# Patient Record
Sex: FEMALE | Employment: FULL TIME | URBAN - METROPOLITAN AREA
[De-identification: names, ages, dates, MRNs, and addresses within clinical notes are randomized per-mention and may not be internally consistent; named-entity substitution may affect disease eponyms.]

---

## 2023-02-06 ENCOUNTER — HOSPITAL ENCOUNTER (OUTPATIENT)
Dept: RESEARCH | Facility: CLINIC | Age: 28
Discharge: HOME | End: 2023-02-06
Attending: PEDIATRICS

## 2023-02-06 PROCEDURE — 510N000017 HC CRU PATIENT CARE, PER 15 MIN

## 2023-02-06 PROCEDURE — 510N000009 HC RESEARCH FACILITY, PER 15 MIN

## 2023-02-06 PROCEDURE — 510N000018 HC RESEARCH OGTT, PER HOUR

## 2023-12-15 ENCOUNTER — HOSPITAL ENCOUNTER (OUTPATIENT)
Dept: RESEARCH | Facility: CLINIC | Age: 28
Discharge: HOME | End: 2023-12-15
Attending: PEDIATRICS

## 2023-12-15 PROCEDURE — 510N000017 HC CRU PATIENT CARE, PER 15 MIN

## 2023-12-15 PROCEDURE — 510N000009 HC RESEARCH FACILITY, PER 15 MIN

## 2023-12-15 PROCEDURE — 510N000018 HC RESEARCH OGTT, PER HOUR

## 2024-06-21 ENCOUNTER — HOSPITAL ENCOUNTER (OUTPATIENT)
Dept: RESEARCH | Facility: CLINIC | Age: 29
Discharge: HOME | End: 2024-06-21
Attending: PEDIATRICS | Admitting: PEDIATRICS
Payer: COMMERCIAL

## 2024-06-21 PROCEDURE — 510N000017 HC CRU PATIENT CARE, PER 15 MIN

## 2024-06-21 PROCEDURE — 510N000009 HC RESEARCH FACILITY, PER 15 MIN

## 2024-06-21 PROCEDURE — 510N000018 HC RESEARCH OGTT, PER HOUR

## 2024-06-30 ENCOUNTER — OFFICE VISIT (OUTPATIENT)
Dept: FAMILY MEDICINE | Facility: CLINIC | Age: 29
End: 2024-06-30
Payer: COMMERCIAL

## 2024-06-30 VITALS
WEIGHT: 148.3 LBS | SYSTOLIC BLOOD PRESSURE: 112 MMHG | HEART RATE: 57 BPM | DIASTOLIC BLOOD PRESSURE: 70 MMHG | TEMPERATURE: 98 F | OXYGEN SATURATION: 100 %

## 2024-06-30 DIAGNOSIS — S16.1XXA STRAIN OF NECK MUSCLE, INITIAL ENCOUNTER: Primary | ICD-10-CM

## 2024-06-30 PROCEDURE — 99213 OFFICE O/P EST LOW 20 MIN: CPT | Performed by: STUDENT IN AN ORGANIZED HEALTH CARE EDUCATION/TRAINING PROGRAM

## 2024-06-30 RX ORDER — METHYLPREDNISOLONE 4 MG
TABLET, DOSE PACK ORAL
Qty: 21 TABLET | Refills: 0 | Status: SHIPPED | OUTPATIENT
Start: 2024-06-30 | End: 2024-08-14

## 2024-06-30 NOTE — PROGRESS NOTES
Assessment & Plan     Strain of neck muscle, initial encounter  Patient has strained her right neck muscle periodically over the past 8 years.  She has had effective treatment with Medrol Dosepak in the past so I prescribed this.  Muscle relaxers have been ineffective in the past.  Diclofenac gel has been mildly helpful and she has some of this at home.  We discussed working on strengthening the muscles that support her neck and posture as possible preventative measures and referral to physical therapy was placed to help her with working on this  - methylPREDNISolone (MEDROL DOSEPAK) 4 MG tablet therapy pack  Dispense: 21 tablet; Refill: 0  - Physical Therapy  Referral       No follow-ups on file.    [facility]    Kailyn Guthrie is a 28 year old female who presents to clinic today for the following health issues:  Chief Complaint   Patient presents with    Neck Pain     Patient states her R side of her neck has been hurting for years. The usual home remedies are no longer working.       HPI      Review of Systems  Constitutional, HEENT, cardiovascular, pulmonary, gi and gu systems are negative, except as otherwise noted.      Objective    /70 (BP Location: Right arm, Patient Position: Chair, Cuff Size: Adult Small)   Pulse 57   Temp 98  F (36.7  C) (Oral)   Wt 67.3 kg (148 lb 4.8 oz)   SpO2 100%   Physical Exam   GENERAL: alert and no distress  MS: Generalized tension and tightness of the right sternocleidomastoid and right upper trapezius muscles with reduced range of motion of the neck due to muscle tension  SKIN: no suspicious lesions or rashes  NEURO: Normal strength and tone, mentation intact and speech normal

## 2024-07-09 NOTE — PROGRESS NOTES
PHYSICAL THERAPY EVALUATION  Type of Visit: Evaluation       Fall Risk Screen:  Fall screen completed by: PT  Have you fallen 2 or more times in the past year?: No  Have you fallen and had an injury in the past year?: No  Is patient a fall risk?: No    Subjective   Pt is a 29 year-old woman with chief complaint neck pain. When playing soccer games this can increase neck pain. She has had cycles over the past 8 years where 1-2x/year her neck gets painful - e.g., after sneezing. 2 weeks ago she turned to the left when in the car ( to look behind) and had almost a popping feeling and this triggered her pain for 2 weeks. She has to take a lot of ibuprofen to help it and uses ice right away. In the past the pain is on the right side of the neck and can go down to the mid-upper back. 2 weeks ago she had pain radiating to the R elbow. She was seen in urgent care and the Medrol dosepack helps.       Presenting condition or subjective complaint: I keep tweaking my neck 1-2x a year and every time it happens I cant do anything for 4+ days bc of the pain, and sometimes i go to urgent care to request methylprednisolone necause nothing else fixes the pain. Im tired of it and need a permant solution.  Date of onset:      Relevant medical history: Concussions; Heart problems; Neck injury   Dates & types of surgery:      Prior diagnostic imaging/testing results:       Prior therapy history for the same diagnosis, illness or injury: No      Prior Level of Function  Transfers: Independent  Ambulation: Independent  ADL: Independent    Living Environment  Social support: With a significant other or spouse   Type of home: House   Stairs to enter the home: Yes 3 Is there a railing: Yes     Ramp: No   Stairs inside the home: Yes 11 Is there a railing: Yes     Help at home: None  Equipment owned:       Employment: Yes Dietitian  Hobbies/Interests: Gardening, Crossfit    Patient goals for therapy: Work out, live my life pain free, and id  really like to not be constantly worried that i will tweak my neck from something stupid like sneezing or turning my head to the back of the car    Pain assessment: Pain present  Location: right side of neck/Ratin/10 at rest,  with movement 7/10     Objective   CERVICAL SPINE EVALUATION    INTEGUMENTARY (edema, incisions):   POSTURE: Decreased cervical lordosis  GAIT:   Weightbearing Status: WBAT  Assistive Device(s): None  Gait Deviations: WNL  BALANCE/PROPRIOCEPTION: WNL    ROM:   (Degrees) Left AROM Right AROM    Cervical Flexion 41, feels good (repetitions decreased pain)    Cervical Extension 44 with pain starting at 40 deg and worsening at end range    Cervical Side bend 29 with right sided pain 15 with right sided pain (worse than left side)    Cervical Rotation 60 43 with right sided pain    Cervical Protrusion WNL, feels good (repetitions decreased pain)    Cervical Retraction WNL, feels good (at end of session, reps of this started to cause some pain)    Thoracic Flexion     Thoracic Extension     Thoracic Rotation       Left AROM Left PROM Right AROM Right PROM   Shoulder Flexion WNL  WNL    Shoulder Extension WNL  WNL    Shoulder Abduction WNL  WNL    Shoulder Adduction       Shoulder IR WNL  WNL    Shoulder ER       Shoulder Horiz Abduction       Shoulder Horiz Adduction         MYOTOMES:    Left Right   C1-2 (Neck Flexion) 4 4   C3 (Neck Side Bend)  4 4   C4 (Shrug) 5 5   C5 (Deltoid) 5 5   C6 (Biceps) 5 5   C7 (Triceps) 5 5   C8 (Thumb Ext) 5 5   T1 (Intrinsics) 5 5     DTR S:   CORD SIGNS:   DERMATOMES: WNL  NEURAL TENSION:   FLEXIBILITY:    SPECIAL TESTS:    Left Right   Alar Ligament    Cervical Flexion-Rotation     Cervical Rot/Lateral Flex     Compression Positive Positive   Distraction Positive Positive   Spurling s     Thoracic Outlet Screen (Jeanne, Adson)     Transverse Ligament     Vertebral Artery     Cotton Roll Test     Craniocervical Flexor Endurance Test Seconds: 30    Mannheimer Test             PALPATION: tender right cervical paraspinals  SPINAL SEGMENTAL CONCLUSIONS: WNL      Assessment & Plan   CLINICAL IMPRESSIONS  Medical Diagnosis: Strain of neck muscle, initial encounter    Treatment Diagnosis: Cervicalgia   Impression/Assessment: Pt is a 29 year-old woman with chief complaint chronic neck pain. She demonstrates symptoms most consistent with discogenic origin. She has pain/limited AROM with cervical ext, right side bending and R rotation with current symptoms affecting the neck and radiating to scapular region only, though one day recently had pain to the R elbow. She had pain relief with cervical distraction and decreased pain with repeated cervical retraction and flexion (and initially also cervical protraction, but this lessened over time). When she is in pain, she is limited in scanning the environment, cannot participate in soccer and working out and even has difficulty with bed mobility. Recommending follow up with spine specialist, possible imaging and she may benefit from MEDEX program in the future for cervical spine strengthening;  she may also benefit from a home traction unit. She is appropriate for skilled PT to address impairments, instruct in HEP to reduce pain and improve functional mobility.     Clinical Decision Making (Complexity):  Clinical Presentation: Stable/Uncomplicated  Clinical Presentation Rationale: based on medical and personal factors listed in PT evaluation  Clinical Decision Making (Complexity): Low complexity    PLAN OF CARE  Treatment Interventions:  Modalities: Mechanical Traction  Interventions: Manual Therapy, Neuromuscular Re-education, Therapeutic Activity, Therapeutic Exercise, Self-Care/Home Management    Long Term Goals     PT Goal 1  Goal Identifier: Pain  Goal Description: Pt will report decreased neck pain from 7/10 with movement to <2/10 to allow her to participate in soccer and crossfit exercises.  Target Date: 08/22/24      Frequency of  Treatment: 1x/week to every other week  Duration of Treatment: 3 visits      Risks and benefits of evaluation/treatment have been explained.   Patient/Family/caregiver agrees with Plan of Care.     Evaluation Time:     PT Eval, Low Complexity Minutes (53627): 20       Signing Clinician: Tor Lewis PT

## 2024-07-10 ENCOUNTER — THERAPY VISIT (OUTPATIENT)
Dept: PHYSICAL THERAPY | Facility: REHABILITATION | Age: 29
End: 2024-07-10
Payer: COMMERCIAL

## 2024-07-10 DIAGNOSIS — M54.2 CERVICALGIA: Primary | ICD-10-CM

## 2024-07-10 PROCEDURE — 97161 PT EVAL LOW COMPLEX 20 MIN: CPT | Mod: GP | Performed by: PHYSICAL THERAPIST

## 2024-07-10 PROCEDURE — 97110 THERAPEUTIC EXERCISES: CPT | Mod: GP | Performed by: PHYSICAL THERAPIST

## 2024-07-10 PROCEDURE — 97140 MANUAL THERAPY 1/> REGIONS: CPT | Mod: GP | Performed by: PHYSICAL THERAPIST

## 2024-07-11 PROBLEM — M54.2 CERVICALGIA: Status: ACTIVE | Noted: 2024-07-11

## 2024-08-07 NOTE — PROGRESS NOTES
No chief complaint on file.     PCP: Barbi Martinez     Referring Provider: Referred Self    There were no vitals taken for this visit.    ENT Problem List:  Patient Active Problem List   Diagnosis    Cervicalgia      Current Medications:  Current Outpatient Medications   Medication Sig Dispense Refill    methylPREDNISolone (MEDROL DOSEPAK) 4 MG tablet therapy pack Follow Package Directions 21 tablet 0     No current facility-administered medications for this visit.     HPI  Pleasant 29 year old female presents today as a new patient for middle ear myoclonus. She describes experiencing a drumming sensation in her ears since Dec 2021. She has previously seen a different provie who told her that there was nothing she could do about it. She experiences this everyday. Walking around the house barefoot, doing daily tasks, dishes clinking, working out can all trigger the experiences. Strenuous activity increases this to an intense pressure that she has do stall to wait it out. The huge pressure shift causes a noticeable change in her subjective hearing.  She attributes dizziness to her low blood pressure. She suspects having chronic eustachian tube dysfunction, but has not had a formal diagnosis. She recalls having plugged ears with nearly every infection, and a left perforation that occurred in high school about 15 years ago due to pressure from an infection. She is able to induce the sensation by tapping her foot, and has noticed that wearing socks can somewhat prevent that while walking in her house. She is a dietician.     Review of Systems   Constitutional: Negative.    HENT:  Positive for hearing loss and tinnitus. Negative for ear pain.    Eyes:  Negative for blurred vision and double vision.   Respiratory:  Negative for cough.    Neurological:  Positive for dizziness.   Endo/Heme/Allergies:  Negative for environmental allergies.   All other systems reviewed and are negative.      Physical Exam  Vitals and  nursing note reviewed.   Constitutional:       Appearance: Normal appearance.   HENT:      Head: Normocephalic and atraumatic.      Right Ear: Tympanic membrane, ear canal and external ear normal. No middle ear effusion.      Left Ear: Tympanic membrane, ear canal and external ear normal.  No middle ear effusion.      Nose: Nose normal.      Mouth/Throat:      Mouth: Mucous membranes are moist.   Eyes:      Extraocular Movements: Extraocular movements intact.      Pupils: Pupils are equal, round, and reactive to light.   Neurological:      Mental Status: She is alert.   Psychiatric:         Behavior: Behavior is cooperative.     + no signs of infection    AUDIOGRAM 08/14/2024:  Results: hearing WNL bilaterally. 100% word rec bilaterally. Tymps WNL. Present 1 kHz ipsi/contra bilaterally.   Right: Speech reception threshold is 10 dB with 100% word recognition. Tympanogram -- type.  Left: Speech reception threshold is 5 dB with 100% word recognition. Tympanogram -- type.    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 She said that she was able to induce the experience while in-clinic, but I was unable to visualize myoclonic vibrations that may or may not be related to her issue. It was explained that there are two muscles in the ears that may be responsible for causing undesired pressure in the middle ear. Her audiogram was independently reviewed and discussed in detail with the patient, which demonstrated normal hearing bilaterally. Stress, sleeplessness and anxiety could be potential causes, and we discussed how medications for these conditions have desired side effects that may improve her symptoms. It has been recommended to explore medication-focused treatment options before considering more invasive surgical options such as TT muscle tenotomy. We discussed the potential of PE tubes and equalizing the pressure to see if there is improvement. She has been advised to  practice good hydration and relaxation, and retrain the brain to learn to ignore the sensations. She has decided to begin with a prescription of clonazePAM (KLONOPIN) to see there is improvement before her next visit. She has been encouraged to reach out if new symptoms arise or if there are any new concerns before then.    Follow up in clinic in 3-weeks.    Scribe/Staff:    Scribe Disclosure:   I, Marita Araujo, am serving as a scribe; to document services personally performed by Desirae Quesada MD based on data collection and the provider's statements to me.     Provider Disclosure:  I agree with above History, Review of Systems, Physical exam and Plan.  I have reviewed the content of the documentation and have edited it as needed. I have personally performed the services documented here and the documentation accurately represents those services and the decisions I have made.      Electronically signed by:  Desirae Quesada MD

## 2024-08-14 ENCOUNTER — OFFICE VISIT (OUTPATIENT)
Dept: AUDIOLOGY | Facility: CLINIC | Age: 29
End: 2024-08-14
Payer: COMMERCIAL

## 2024-08-14 ENCOUNTER — OFFICE VISIT (OUTPATIENT)
Dept: OTOLARYNGOLOGY | Facility: CLINIC | Age: 29
End: 2024-08-14
Payer: COMMERCIAL

## 2024-08-14 DIAGNOSIS — H93.91 EAR PROBLEM, RIGHT: Primary | ICD-10-CM

## 2024-08-14 DIAGNOSIS — H93.11 TENSOR TYMPANI INDUCED TINNITUS OF RIGHT EAR: Primary | ICD-10-CM

## 2024-08-14 PROCEDURE — 99203 OFFICE O/P NEW LOW 30 MIN: CPT | Performed by: OTOLARYNGOLOGY

## 2024-08-14 PROCEDURE — 92557 COMPREHENSIVE HEARING TEST: CPT | Performed by: AUDIOLOGIST

## 2024-08-14 PROCEDURE — 92550 TYMPANOMETRY & REFLEX THRESH: CPT | Performed by: AUDIOLOGIST

## 2024-08-14 RX ORDER — CLONAZEPAM 0.5 MG/1
0.5 TABLET ORAL 2 TIMES DAILY PRN
Qty: 60 TABLET | Refills: 0 | Status: SHIPPED | OUTPATIENT
Start: 2024-08-14 | End: 2024-09-13

## 2024-08-14 ASSESSMENT — ENCOUNTER SYMPTOMS
DIZZINESS: 1
COUGH: 0
CONSTITUTIONAL NEGATIVE: 1
DOUBLE VISION: 0
BLURRED VISION: 0

## 2024-08-14 NOTE — PROGRESS NOTES
AUDIOLOGY REPORT    SUMMARY: Audiology visit completed. See audiogram for results.    RECOMMENDATIONS: Follow-up with ENT.    Elena Kulkarni  Doctor of Audiology  [place] License # [id number]

## 2024-08-14 NOTE — NURSING NOTE
Jerri GARCIA Lopez's chief complaint for this visit includes:  Chief Complaint   Patient presents with    Consult     Bilateral middle ear myoclonus, right worse than left, drumming sound in ear, ongoing since December 2021. Seen by ENT in January 2022 who told her there is nothing to help with it. No improvements, but she feels as though she is getting used to the noise. Onset when walking barefoot on hard surface, clanging dishes/silverware together, or after working out. No pain in the ears.     PCP: Barbi Martinez    Referring Provider:  Referred Self, MD  No address on file    There were no vitals taken for this visit.      Jonny Alaniz, EMT  August 14, 2024

## 2024-08-14 NOTE — LETTER
8/14/2024      Jerri Lopez  [address]  [place] [address]      Dear Colleague,    Thank you for referring your patient, Jerri Lopez, to the [facility]. Please see a copy of my visit note below.    No chief complaint on file.     PCP: Barbi Martinez     Referring Provider: Referred Self    There were no vitals taken for this visit.    ENT Problem List:  Patient Active Problem List   Diagnosis     Cervicalgia      Current Medications:  Current Outpatient Medications   Medication Sig Dispense Refill     methylPREDNISolone (MEDROL DOSEPAK) 4 MG tablet therapy pack Follow Package Directions 21 tablet 0     No current facility-administered medications for this visit.     HPI  Pleasant 29 year old female presents today as a new patient for middle ear myoclonus. She describes experiencing a drumming sensation in her ears since Dec 2021. She has previously seen a different provie who told her that there was nothing she could do about it. She experiences this everyday. Walking around the house barefoot, doing daily tasks, dishes clinking, working out can all trigger the experiences. Strenuous activity increases this to an intense pressure that she has do stall to wait it out. The huge pressure shift causes a noticeable change in her subjective hearing.  She attributes dizziness to her low blood pressure. She suspects having chronic eustachian tube dysfunction, but has not had a formal diagnosis. She recalls having plugged ears with nearly every infection, and a left perforation that occurred in high school about 15 years ago due to pressure from an infection. She is able to induce the sensation by tapping her foot, and has noticed that wearing socks can somewhat prevent that while walking in her house. She is a dietician.     Review of Systems   Constitutional: Negative.    HENT:  Positive for hearing loss and tinnitus. Negative for ear pain.    Eyes:  Negative  for blurred vision and double vision.   Respiratory:  Negative for cough.    Neurological:  Positive for dizziness.   Endo/Heme/Allergies:  Negative for environmental allergies.   All other systems reviewed and are negative.      Physical Exam  Vitals and nursing note reviewed.   Constitutional:       Appearance: Normal appearance.   HENT:      Head: Normocephalic and atraumatic.      Right Ear: Tympanic membrane, ear canal and external ear normal. No middle ear effusion.      Left Ear: Tympanic membrane, ear canal and external ear normal.  No middle ear effusion.      Nose: Nose normal.      Mouth/Throat:      Mouth: Mucous membranes are moist.   Eyes:      Extraocular Movements: Extraocular movements intact.      Pupils: Pupils are equal, round, and reactive to light.   Neurological:      Mental Status: She is alert.   Psychiatric:         Behavior: Behavior is cooperative.     + no signs of infection    AUDIOGRAM 08/14/2024:  Results: hearing WNL bilaterally. 100% word rec bilaterally. Tymps WNL. Present 1 kHz ipsi/contra bilaterally.   Right: Speech reception threshold is 10 dB with 100% word recognition. Tympanogram -- type.  Left: Speech reception threshold is 5 dB with 100% word recognition. Tympanogram -- type.    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 She said that she was able to induce the experience while in-clinic, but I was unable to visualize myoclonic vibrations that may or may not be related to her issue. It was explained that there are two muscles in the ears that may be responsible for causing undesired pressure in the middle ear. Her audiogram was independently reviewed and discussed in detail with the patient, which demonstrated normal hearing bilaterally. Stress, sleeplessness and anxiety could be potential causes, and we discussed how medications for these conditions have desired side effects that may improve her symptoms. It has been  recommended to explore medication-focused treatment options before considering more invasive surgical options such as TT muscle tenotomy. We discussed the potential of PE tubes and equalizing the pressure to see if there is improvement. She has been advised to practice good hydration and relaxation, and retrain the brain to learn to ignore the sensations. She has decided to begin with a prescription of clonazePAM (KLONOPIN) to see there is improvement before her next visit. She has been encouraged to reach out if new symptoms arise or if there are any new concerns before then.    Follow up in clinic in 3-weeks.    Scribe/Staff:    Scribe Disclosure:   I, Marita Araujo, am serving as a scribe; to document services personally performed by Desirae Quesada MD based on data collection and the provider's statements to me.     Provider Disclosure:  I agree with above History, Review of Systems, Physical exam and Plan.  I have reviewed the content of the documentation and have edited it as needed. I have personally performed the services documented here and the documentation accurately represents those services and the decisions I have made.      Electronically signed by:  Desirae Quesada MD         Again, thank you for allowing me to participate in the care of your patient.        Sincerely,        Desirae Quesada MD

## 2024-12-23 PROBLEM — M54.2 CERVICALGIA: Status: RESOLVED | Noted: 2024-07-11 | Resolved: 2024-12-23

## 2025-01-13 ENCOUNTER — HOSPITAL ENCOUNTER (OUTPATIENT)
Dept: RESEARCH | Facility: CLINIC | Age: 30
Discharge: HOME | End: 2025-01-13
Attending: PEDIATRICS
Payer: COMMERCIAL

## 2025-01-13 PROCEDURE — 510N000018 HC RESEARCH OGTT, PER HOUR

## 2025-01-13 PROCEDURE — 510N000017 HC CRU PATIENT CARE, PER 15 MIN

## 2025-01-13 PROCEDURE — 510N000009 HC RESEARCH FACILITY, PER 15 MIN

## 2025-01-29 ENCOUNTER — OFFICE VISIT (OUTPATIENT)
Dept: OTOLARYNGOLOGY | Facility: CLINIC | Age: 30
End: 2025-01-29
Payer: COMMERCIAL

## 2025-01-29 DIAGNOSIS — H93.231 HYPERACUSIS OF RIGHT EAR: ICD-10-CM

## 2025-01-29 DIAGNOSIS — H93.11 TENSOR TYMPANI INDUCED TINNITUS OF RIGHT EAR: Primary | ICD-10-CM

## 2025-01-29 RX ORDER — CYCLOBENZAPRINE HCL 5 MG
5 TABLET ORAL 3 TIMES DAILY PRN
Qty: 60 TABLET | Refills: 0 | Status: SHIPPED | OUTPATIENT
Start: 2025-01-29

## 2025-01-29 ASSESSMENT — ENCOUNTER SYMPTOMS
DOUBLE VISION: 0
BLURRED VISION: 0
GASTROINTESTINAL NEGATIVE: 1
CONSTITUTIONAL NEGATIVE: 1
TINGLING: 0
EYES NEGATIVE: 1
RESPIRATORY NEGATIVE: 1
DIZZINESS: 0
FALLS: 0
COUGH: 0
WEAKNESS: 0

## 2025-01-29 NOTE — NURSING NOTE
Jerri Lopez's chief complaint for this visit includes:  Chief Complaint   Patient presents with    Follow Up     Right ear tensor tympani induced tinnitus, some improvements. Continues to have constant tapping/drumming in the ear, but the intensity of those symptoms decreased by about 25-30% when on Clonazepam.      PCP: Barbi Martinez    Referring Provider:  Referred Self, MD  No address on file    There were no vitals taken for this visit.      Jonny Alaniz, EMT  January 29, 2025

## 2025-01-29 NOTE — LETTER
1/29/2025      Jerri Lopez  [address]  [place] [address]      Dear Colleague,    Thank you for referring your patient, Jerri Lopez, to the [facility]. Please see a copy of my visit note below.    Chief Complaint   Patient presents with     Follow Up     Right ear tensor tympani induced tinnitus, some improvements. Continues to have constant tapping/drumming in the ear, but the intensity of those symptoms decreased by about 25-30% when on Clonazepam.       PCP: Barbi Martinez     Referring Provider: Referred Self    There were no vitals taken for this visit.    ENT Problem List:  Patient Active Problem List   Diagnosis   (none) - all problems resolved or deleted      Current Medications:  Current Outpatient Medications   Medication Sig Dispense Refill     clonazePAM (KLONOPIN) 0.5 MG tablet Take 1 tablet (0.5 mg) by mouth 2 times daily as needed for anxiety 60 tablet 0     No current facility-administered medications for this visit.     HPI  Pleasant 29 year old female presents today as a(n) established patient for right tensor tympani induced tinnitus. She was last seen on 8/14/24. She describes the tinnitus as not synchronized with her heartbeat, but it sounds like a drumming or tapping. Although her symptoms improved about 25-30% on Clonazepam, she reports that she still has symptoms every day throughout the day. She states that it happens most often with loud noises such as putting the dishes or silverware away or when walking around her house barefoot. She states that she can cause the tensor tympani induced tinnitus when walking barefoot. She states that she gets aural fullness with the tinnitus worst after exercising. She states that she has to wait 10-20 minutes for her aural fullness to change before driving after working out. She does not feel aural fullness every day unless she works out.  She reports that she sometimes has nasal congestion,    She states that she has a hx of left TM perforation in high school with a bad cold.  Of note, she recently had surgery to repair her torn ACL.    She denies dizziness/vertigo, allergies, rhinorrhea, sneezing, coughing, itchy eyes or nose, blurry vision, double vision, weakness, numbness, tingling, imbalance, hx of head and neck surgeries, buzzing or ringing in the ears.    Review of Systems   Constitutional: Negative.    HENT:  Positive for congestion and tinnitus.    Eyes: Negative.  Negative for blurred vision and double vision.   Respiratory: Negative.  Negative for cough.    Gastrointestinal: Negative.    Musculoskeletal:  Negative for falls.   Skin: Negative.  Negative for itching.   Neurological:  Negative for dizziness, tingling and weakness.   Endo/Heme/Allergies: Negative.  Negative for environmental allergies.       Physical Exam  Vitals and nursing note reviewed.   Constitutional:       Appearance: Normal appearance.   HENT:      Head: Normocephalic and atraumatic.      Jaw: There is normal jaw occlusion.      Right Ear: Hearing, tympanic membrane and ear canal normal.      Left Ear: Hearing, tympanic membrane and ear canal normal.      Nose: No mucosal edema, congestion or rhinorrhea.      Right Nostril: No occlusion.      Left Nostril: No occlusion.      Right Turbinates: Not enlarged or swollen.      Left Turbinates: Not enlarged or swollen.      Right Sinus: No maxillary sinus tenderness or frontal sinus tenderness.      Left Sinus: No maxillary sinus tenderness or frontal sinus tenderness.      Mouth/Throat:      Mouth: Mucous membranes are moist.      Pharynx: Oropharynx is clear. Uvula midline.   Eyes:      Extraocular Movements: Extraocular movements intact.      Pupils: Pupils are equal, round, and reactive to light.   Neurological:      Mental Status: She is alert.       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 Potential  causes such as cochlear or cranial nerve issues were discussed. The option of an MR brain was discussed for further investigation, however the patient defers this option at this time but will consider it for the future. Additional options including muscle relaxers, exposing her right ear to sound to train its reaction, desensitization, avoiding any loud noise, controlling allergies, stress, anxiety, and a right myringotomy with PE tube insertion were reviewed. She elects to start a muscle relaxer, therefore cyclobenzaprine (FLEXERIL) 5 MG tablet, take 1 tablet (5 mg) by mouth 3 times daily as needed for muscle spasms, was prescribed today. She is advised the following:  Avoid salt  Get good hydration  Get good sleep/rest  Manage stress and anxiety    Follow up in clinic prn    Scribe/Staff:    Scribe Disclosure:   I, Shital Cazares, am serving as a scribe; to document services personally performed by Desirae Quesada MD based on data collection and the provider's statements to me.     Provider Disclosure:  I agree with above History, Review of Systems, Physical exam and Plan.  I have reviewed the content of the documentation and have edited it as needed. I have personally performed the services documented here and the documentation accurately represents those services and the decisions I have made.      Electronically signed by:  Dseirae Quesada MD         Again, thank you for allowing me to participate in the care of your patient.        Sincerely,        Desirae Quesada MD    Electronically signed

## 2025-01-29 NOTE — PROGRESS NOTES
Chief Complaint   Patient presents with    Follow Up     Right ear tensor tympani induced tinnitus, some improvements. Continues to have constant tapping/drumming in the ear, but the intensity of those symptoms decreased by about 25-30% when on Clonazepam.       PCP: Barbi Martinez     Referring Provider: Referred Self    There were no vitals taken for this visit.    ENT Problem List:  Patient Active Problem List   Diagnosis   (none) - all problems resolved or deleted      Current Medications:  Current Outpatient Medications   Medication Sig Dispense Refill    clonazePAM (KLONOPIN) 0.5 MG tablet Take 1 tablet (0.5 mg) by mouth 2 times daily as needed for anxiety 60 tablet 0     No current facility-administered medications for this visit.     HPI  Pleasant 29 year old female presents today as a(n) established patient for right tensor tympani induced tinnitus. She was last seen on 8/14/24. She describes the tinnitus as not synchronized with her heartbeat, but it sounds like a drumming or tapping. Although her symptoms improved about 25-30% on Clonazepam, she reports that she still has symptoms every day throughout the day. She states that it happens most often with loud noises such as putting the dishes or silverware away or when walking around her house barefoot. She states that she can cause the tensor tympani induced tinnitus when walking barefoot. She states that she gets aural fullness with the tinnitus worst after exercising. She states that she has to wait 10-20 minutes for her aural fullness to change before driving after working out. She does not feel aural fullness every day unless she works out.  She reports that she sometimes has nasal congestion,   She states that she has a hx of left TM perforation in high school with a bad cold.  Of note, she recently had surgery to repair her torn ACL.    She denies dizziness/vertigo, allergies, rhinorrhea, sneezing, coughing, itchy eyes or nose, blurry vision,  double vision, weakness, numbness, tingling, imbalance, hx of head and neck surgeries, buzzing or ringing in the ears.    Review of Systems   Constitutional: Negative.    HENT:  Positive for congestion and tinnitus.    Eyes: Negative.  Negative for blurred vision and double vision.   Respiratory: Negative.  Negative for cough.    Gastrointestinal: Negative.    Musculoskeletal:  Negative for falls.   Skin: Negative.  Negative for itching.   Neurological:  Negative for dizziness, tingling and weakness.   Endo/Heme/Allergies: Negative.  Negative for environmental allergies.       Physical Exam  Vitals and nursing note reviewed.   Constitutional:       Appearance: Normal appearance.   HENT:      Head: Normocephalic and atraumatic.      Jaw: There is normal jaw occlusion.      Right Ear: Hearing, tympanic membrane and ear canal normal.      Left Ear: Hearing, tympanic membrane and ear canal normal.      Nose: No mucosal edema, congestion or rhinorrhea.      Right Nostril: No occlusion.      Left Nostril: No occlusion.      Right Turbinates: Not enlarged or swollen.      Left Turbinates: Not enlarged or swollen.      Right Sinus: No maxillary sinus tenderness or frontal sinus tenderness.      Left Sinus: No maxillary sinus tenderness or frontal sinus tenderness.      Mouth/Throat:      Mouth: Mucous membranes are moist.      Pharynx: Oropharynx is clear. Uvula midline.   Eyes:      Extraocular Movements: Extraocular movements intact.      Pupils: Pupils are equal, round, and reactive to light.   Neurological:      Mental Status: She is alert.       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 Potential causes such as cochlear or cranial nerve issues were discussed. The option of an MR brain was discussed for further investigation, however the patient defers this option at this time but will consider it for the future. Additional options including muscle  relaxers, exposing her right ear to sound to train its reaction, desensitization, avoiding any loud noise, controlling allergies, stress, anxiety, and a right myringotomy with PE tube insertion were reviewed. She elects to start a muscle relaxer, therefore cyclobenzaprine (FLEXERIL) 5 MG tablet, take 1 tablet (5 mg) by mouth 3 times daily as needed for muscle spasms, was prescribed today. She is advised the following:  Avoid salt  Get good hydration  Get good sleep/rest  Manage stress and anxiety    Follow up in clinic prn    Scribe/Staff:    Scribe Disclosure:   I, Shital Cazares, am serving as a scribe; to document services personally performed by Desirae Quesada MD based on data collection and the provider's statements to me.     Provider Disclosure:  I agree with above History, Review of Systems, Physical exam and Plan.  I have reviewed the content of the documentation and have edited it as needed. I have personally performed the services documented here and the documentation accurately represents those services and the decisions I have made.      Electronically signed by:  Desirae Quesada MD

## 2025-06-08 ENCOUNTER — HEALTH MAINTENANCE LETTER (OUTPATIENT)
Age: 30
End: 2025-06-08

## 2025-07-14 ENCOUNTER — HOSPITAL ENCOUNTER (OUTPATIENT)
Dept: RESEARCH | Facility: CLINIC | Age: 30
Discharge: HOME | End: 2025-07-14
Attending: PEDIATRICS
Payer: COMMERCIAL

## 2025-07-14 PROCEDURE — 510N000009 HC RESEARCH FACILITY, PER 15 MIN

## 2025-07-14 PROCEDURE — 510N000017 HC CRU PATIENT CARE, PER 15 MIN
